# Patient Record
Sex: MALE | Race: WHITE | Employment: OTHER | URBAN - METROPOLITAN AREA
[De-identification: names, ages, dates, MRNs, and addresses within clinical notes are randomized per-mention and may not be internally consistent; named-entity substitution may affect disease eponyms.]

---

## 2017-03-22 ENCOUNTER — HOSPITAL ENCOUNTER (OUTPATIENT)
Age: 57
Discharge: HOME | End: 2017-03-22
Payer: COMMERCIAL

## 2017-03-22 ENCOUNTER — HOSPITAL ENCOUNTER (OUTPATIENT)
Dept: GENERAL RADIOLOGY | Age: 57
Discharge: HOME | End: 2017-03-22
Payer: COMMERCIAL

## 2017-03-22 ENCOUNTER — OFFICE VISIT (OUTPATIENT)
Dept: FAMILY MEDICINE CLINIC | Age: 57
End: 2017-03-22

## 2017-03-22 VITALS
OXYGEN SATURATION: 99 % | RESPIRATION RATE: 18 BRPM | SYSTOLIC BLOOD PRESSURE: 128 MMHG | WEIGHT: 220.5 LBS | HEIGHT: 73 IN | DIASTOLIC BLOOD PRESSURE: 68 MMHG | TEMPERATURE: 97.9 F | BODY MASS INDEX: 29.22 KG/M2 | HEART RATE: 60 BPM

## 2017-03-22 DIAGNOSIS — H93.8X3 EAR FULLNESS, BILATERAL: Primary | ICD-10-CM

## 2017-03-22 DIAGNOSIS — H61.23 EXCESSIVE CERUMEN IN EAR CANAL, BILATERAL: ICD-10-CM

## 2017-03-22 DIAGNOSIS — M25.562 ACUTE PAIN OF LEFT KNEE: ICD-10-CM

## 2017-03-22 PROCEDURE — 73562 X-RAY EXAM OF KNEE 3: CPT

## 2017-03-22 PROCEDURE — 99213 OFFICE O/P EST LOW 20 MIN: CPT | Performed by: NURSE PRACTITIONER

## 2017-03-22 PROCEDURE — 69210 REMOVE IMPACTED EAR WAX UNI: CPT | Performed by: NURSE PRACTITIONER

## 2017-03-28 ENCOUNTER — TELEPHONE (OUTPATIENT)
Dept: FAMILY MEDICINE CLINIC | Age: 57
End: 2017-03-28

## 2017-03-29 DIAGNOSIS — M23.8X2 CREPITUS OF JOINT OF LEFT KNEE: ICD-10-CM

## 2017-03-29 DIAGNOSIS — M25.562 ACUTE PAIN OF LEFT KNEE: Primary | ICD-10-CM

## 2017-04-08 ASSESSMENT — ENCOUNTER SYMPTOMS
NAUSEA: 0
ABDOMINAL DISTENTION: 0
SORE THROAT: 0
ANAL BLEEDING: 0
CONSTIPATION: 0
COUGH: 0
RHINORRHEA: 0
DIARRHEA: 0
WHEEZING: 0
VOMITING: 0
BLOOD IN STOOL: 0
SHORTNESS OF BREATH: 0
ABDOMINAL PAIN: 0
CHEST TIGHTNESS: 0

## 2018-02-22 ENCOUNTER — OFFICE VISIT (OUTPATIENT)
Dept: FAMILY MEDICINE CLINIC | Age: 58
End: 2018-02-22
Payer: COMMERCIAL

## 2018-02-22 VITALS
HEIGHT: 72 IN | DIASTOLIC BLOOD PRESSURE: 78 MMHG | HEART RATE: 59 BPM | SYSTOLIC BLOOD PRESSURE: 122 MMHG | BODY MASS INDEX: 31.29 KG/M2 | TEMPERATURE: 98.2 F | WEIGHT: 231 LBS | RESPIRATION RATE: 12 BRPM | OXYGEN SATURATION: 99 %

## 2018-02-22 DIAGNOSIS — R20.2 NUMBNESS AND TINGLING IN BOTH HANDS: ICD-10-CM

## 2018-02-22 DIAGNOSIS — H61.21 EXCESSIVE CERUMEN IN EAR CANAL, RIGHT: Primary | ICD-10-CM

## 2018-02-22 DIAGNOSIS — R20.0 NUMBNESS AND TINGLING IN BOTH HANDS: ICD-10-CM

## 2018-02-22 PROCEDURE — 99213 OFFICE O/P EST LOW 20 MIN: CPT | Performed by: NURSE PRACTITIONER

## 2018-02-22 PROCEDURE — 69210 REMOVE IMPACTED EAR WAX UNI: CPT | Performed by: NURSE PRACTITIONER

## 2018-03-01 ENCOUNTER — OFFICE VISIT (OUTPATIENT)
Dept: FAMILY MEDICINE CLINIC | Age: 58
End: 2018-03-01
Payer: COMMERCIAL

## 2018-03-01 VITALS
DIASTOLIC BLOOD PRESSURE: 76 MMHG | WEIGHT: 229.25 LBS | RESPIRATION RATE: 14 BRPM | HEART RATE: 67 BPM | OXYGEN SATURATION: 97 % | TEMPERATURE: 97.7 F | HEIGHT: 72 IN | BODY MASS INDEX: 31.05 KG/M2 | SYSTOLIC BLOOD PRESSURE: 132 MMHG

## 2018-03-01 DIAGNOSIS — H61.21 EXCESSIVE CERUMEN IN EAR CANAL, RIGHT: Primary | ICD-10-CM

## 2018-03-01 PROCEDURE — 99212 OFFICE O/P EST SF 10 MIN: CPT | Performed by: NURSE PRACTITIONER

## 2018-03-01 PROCEDURE — 69210 REMOVE IMPACTED EAR WAX UNI: CPT | Performed by: NURSE PRACTITIONER

## 2018-03-04 ASSESSMENT — ENCOUNTER SYMPTOMS
SHORTNESS OF BREATH: 0
COUGH: 0
RHINORRHEA: 0
VOMITING: 0
WHEEZING: 0
CONSTIPATION: 0
BLOOD IN STOOL: 0
ANAL BLEEDING: 0
DIARRHEA: 0
NAUSEA: 0
CHEST TIGHTNESS: 0
SORE THROAT: 0
ABDOMINAL PAIN: 0
ABDOMINAL DISTENTION: 0

## 2018-03-25 ASSESSMENT — ENCOUNTER SYMPTOMS
ABDOMINAL PAIN: 0
DIARRHEA: 0
NAUSEA: 0
VOMITING: 0
BLOOD IN STOOL: 0
ANAL BLEEDING: 0
SHORTNESS OF BREATH: 0
ABDOMINAL DISTENTION: 0
COUGH: 0
RHINORRHEA: 0
WHEEZING: 0
SORE THROAT: 0
CHEST TIGHTNESS: 0
CONSTIPATION: 0

## 2019-01-29 ENCOUNTER — OFFICE VISIT (OUTPATIENT)
Dept: INTERNAL MEDICINE | Age: 59
End: 2019-01-29
Payer: COMMERCIAL

## 2019-01-29 VITALS
RESPIRATION RATE: 12 BRPM | SYSTOLIC BLOOD PRESSURE: 124 MMHG | DIASTOLIC BLOOD PRESSURE: 74 MMHG | HEIGHT: 72 IN | BODY MASS INDEX: 30.8 KG/M2 | WEIGHT: 227.4 LBS | HEART RATE: 68 BPM

## 2019-01-29 DIAGNOSIS — L85.3 DRY SKIN DERMATITIS: Primary | ICD-10-CM

## 2019-01-29 PROCEDURE — 99202 OFFICE O/P NEW SF 15 MIN: CPT | Performed by: PHYSICIAN ASSISTANT

## 2019-01-29 RX ORDER — CHLORAL HYDRATE 500 MG
3000 CAPSULE ORAL DAILY
COMMUNITY

## 2019-01-29 RX ORDER — TRIAMCINOLONE ACETONIDE 5 MG/G
OINTMENT TOPICAL
Qty: 1 TUBE | Refills: 0 | Status: SHIPPED | OUTPATIENT
Start: 2019-01-29 | End: 2019-10-14 | Stop reason: SDUPTHER

## 2019-01-29 ASSESSMENT — PATIENT HEALTH QUESTIONNAIRE - PHQ9
SUM OF ALL RESPONSES TO PHQ QUESTIONS 1-9: 0
1. LITTLE INTEREST OR PLEASURE IN DOING THINGS: 0
SUM OF ALL RESPONSES TO PHQ9 QUESTIONS 1 & 2: 0
SUM OF ALL RESPONSES TO PHQ QUESTIONS 1-9: 0
2. FEELING DOWN, DEPRESSED OR HOPELESS: 0

## 2019-01-29 ASSESSMENT — ENCOUNTER SYMPTOMS: COLOR CHANGE: 1

## 2019-02-13 ENCOUNTER — OFFICE VISIT (OUTPATIENT)
Dept: INTERNAL MEDICINE | Age: 59
End: 2019-02-13
Payer: COMMERCIAL

## 2019-02-13 VITALS
SYSTOLIC BLOOD PRESSURE: 116 MMHG | DIASTOLIC BLOOD PRESSURE: 70 MMHG | WEIGHT: 227.4 LBS | TEMPERATURE: 98.3 F | OXYGEN SATURATION: 98 % | HEART RATE: 65 BPM | HEIGHT: 72 IN | BODY MASS INDEX: 30.8 KG/M2

## 2019-02-13 DIAGNOSIS — G56.03 BILATERAL CARPAL TUNNEL SYNDROME: Primary | ICD-10-CM

## 2019-02-13 PROCEDURE — 99214 OFFICE O/P EST MOD 30 MIN: CPT | Performed by: FAMILY MEDICINE

## 2019-02-13 RX ORDER — GLUCOSAMINE SULFATE 500 MG
1000 CAPSULE ORAL
COMMUNITY

## 2019-02-13 ASSESSMENT — ENCOUNTER SYMPTOMS
EYE ITCHING: 0
APNEA: 0
CHEST TIGHTNESS: 0
CHOKING: 0
EYE DISCHARGE: 0
EYE PAIN: 0

## 2019-04-03 ENCOUNTER — HOSPITAL ENCOUNTER (OUTPATIENT)
Dept: NEUROLOGY | Age: 59
Discharge: HOME | End: 2019-04-03
Payer: COMMERCIAL

## 2019-04-03 DIAGNOSIS — G56.03 BILATERAL CARPAL TUNNEL SYNDROME: ICD-10-CM

## 2019-04-03 PROCEDURE — 95886 MUSC TEST DONE W/N TEST COMP: CPT

## 2019-04-03 PROCEDURE — 95910 NRV CNDJ TEST 7-8 STUDIES: CPT

## 2019-04-03 NOTE — PROCEDURES
[address]                      [facility], [address] [facility]                             ELECTROMYOGRAM REPORT    PATIENT NAME: Mart Franco                 :        1960  MED REC NO:   [id number]                            ROOM:  ACCOUNT NO:   [de-identified]                           ADMIT DATE: 2019  PROVIDER:     Glendell Hammans, MD    DATE OF EM2019    REFERRING PROVIDER:  Lizz Martinez MD.    REASON FOR STUDY:  The patient was having numbness in the hands. EMG  study was done to look for peripheral nerve entrapments versus  peripheral neuropathy versus cervical radiculopathy. Motor nerve conduction velocities slowed in the left median nerve,  borderline in the right median nerve and normal in the ulnar nerves  bilaterally. Distal motor and sensory latencies are normal in the ulnar nerves but significantly delayed in the median nerves, being worse on the left side. On the concentric needle electrode examination, denervation changes are  present in the thenar muscles. Denervation changes are also present in the C5-C6 root distribution  bilaterally. 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 The patient also has moderate changes of C5 radiculopathy with  mild-to-moderate involvement of C6 roots. Cervical radiculopathy seems asymptomatic at this time. The patient is being tried on conservative management. Due to continued  symptoms, he will need decompression of the median nerves. If clinically indicated, imaging of the cervical canal may be done to  look for compromise of the spinal canal and/or foramina. Thank you very much Dr. Woody Augustine for allowing me to see the patient.    Please feel free to call me if I can be of any further

## 2019-04-23 ENCOUNTER — HOSPITAL ENCOUNTER (OUTPATIENT)
Age: 59
Discharge: HOME | End: 2019-04-25
Payer: COMMERCIAL

## 2019-04-23 ENCOUNTER — HOSPITAL ENCOUNTER (OUTPATIENT)
Dept: GENERAL RADIOLOGY | Age: 59
Discharge: HOME | End: 2019-04-25
Payer: COMMERCIAL

## 2019-04-23 ENCOUNTER — HOSPITAL ENCOUNTER (OUTPATIENT)
Age: 59
Discharge: HOME | End: 2019-04-23
Payer: COMMERCIAL

## 2019-04-23 ENCOUNTER — HOSPITAL ENCOUNTER (OUTPATIENT)
Dept: LAB | Age: 59
Discharge: HOME | End: 2019-04-23
Payer: COMMERCIAL

## 2019-04-23 DIAGNOSIS — Z01.818 PREOPERATIVE EVALUATION TO RULE OUT SURGICAL CONTRAINDICATION: ICD-10-CM

## 2019-04-23 LAB
ANION GAP SERPL CALCULATED.3IONS-SCNC: 13 MEQ/L (ref 9–15)
BASOPHILS ABSOLUTE: 0.1 K/UL (ref 0–0.2)
BASOPHILS RELATIVE PERCENT: 0.8 %
BUN BLDV-MCNC: 16 MG/DL (ref 6–20)
CALCIUM SERPL-MCNC: 9.2 MG/DL (ref 8.5–9.9)
CHLORIDE BLD-SCNC: 106 MEQ/L (ref 95–107)
CO2: 22 MEQ/L (ref 20–31)
CREAT SERPL-MCNC: 0.61 MG/DL (ref 0.7–1.2)
EKG ATRIAL RATE: 61 BPM
EKG P AXIS: 61 DEGREES
EKG P-R INTERVAL: 164 MS
EKG Q-T INTERVAL: 400 MS
EKG QRS DURATION: 86 MS
EKG QTC CALCULATION (BAZETT): 402 MS
EKG R AXIS: -8 DEGREES
EKG T AXIS: 49 DEGREES
EKG VENTRICULAR RATE: 61 BPM
EOSINOPHILS ABSOLUTE: 0.3 K/UL (ref 0–0.7)
EOSINOPHILS RELATIVE PERCENT: 4.7 %
GFR AFRICAN AMERICAN: >60
GFR NON-AFRICAN AMERICAN: >60
GLUCOSE BLD-MCNC: 122 MG/DL (ref 70–99)
HCT VFR BLD CALC: 43.9 % (ref 42–52)
HEMOGLOBIN: 15 G/DL (ref 14–18)
LYMPHOCYTES ABSOLUTE: 2.4 K/UL (ref 1–4.8)
LYMPHOCYTES RELATIVE PERCENT: 34.1 %
MCH RBC QN AUTO: 30.5 PG (ref 27–31.3)
MCHC RBC AUTO-ENTMCNC: 34.1 % (ref 33–37)
MCV RBC AUTO: 89.4 FL (ref 80–100)
MONOCYTES ABSOLUTE: 0.6 K/UL (ref 0.2–0.8)
MONOCYTES RELATIVE PERCENT: 8.3 %
NEUTROPHILS ABSOLUTE: 3.6 K/UL (ref 1.4–6.5)
NEUTROPHILS RELATIVE PERCENT: 52.1 %
PDW BLD-RTO: 13.2 % (ref 11.5–14.5)
PLATELET # BLD: 252 K/UL (ref 130–400)
POTASSIUM SERPL-SCNC: 4.4 MEQ/L (ref 3.4–4.9)
RBC # BLD: 4.91 M/UL (ref 4.7–6.1)
SODIUM BLD-SCNC: 141 MEQ/L (ref 135–144)
WBC # BLD: 7 K/UL (ref 4.8–10.8)

## 2019-04-23 PROCEDURE — 93005 ELECTROCARDIOGRAM TRACING: CPT

## 2019-04-23 PROCEDURE — 85025 COMPLETE CBC W/AUTO DIFF WBC: CPT

## 2019-04-23 PROCEDURE — 71046 X-RAY EXAM CHEST 2 VIEWS: CPT

## 2019-04-23 PROCEDURE — 36415 COLL VENOUS BLD VENIPUNCTURE: CPT

## 2019-04-23 PROCEDURE — 80048 BASIC METABOLIC PNL TOTAL CA: CPT

## 2019-04-24 PROCEDURE — 93010 ELECTROCARDIOGRAM REPORT: CPT | Performed by: INTERNAL MEDICINE

## 2019-05-01 ENCOUNTER — ANESTHESIA EVENT (OUTPATIENT)
Dept: OPERATING ROOM | Age: 59
End: 2019-05-01
Payer: COMMERCIAL

## 2019-05-02 ENCOUNTER — ANESTHESIA (OUTPATIENT)
Dept: OPERATING ROOM | Age: 59
End: 2019-05-02
Payer: COMMERCIAL

## 2019-05-02 ENCOUNTER — HOSPITAL ENCOUNTER (OUTPATIENT)
Age: 59
Setting detail: OUTPATIENT SURGERY
Discharge: HOME | End: 2019-05-02
Attending: ORTHOPAEDIC SURGERY | Admitting: ORTHOPAEDIC SURGERY
Payer: COMMERCIAL

## 2019-05-02 VITALS
OXYGEN SATURATION: 94 % | TEMPERATURE: 97.1 F | WEIGHT: 220 LBS | DIASTOLIC BLOOD PRESSURE: 66 MMHG | HEIGHT: 73 IN | BODY MASS INDEX: 29.16 KG/M2 | HEART RATE: 58 BPM | RESPIRATION RATE: 16 BRPM | SYSTOLIC BLOOD PRESSURE: 110 MMHG

## 2019-05-02 VITALS
RESPIRATION RATE: 1 BRPM | OXYGEN SATURATION: 97 % | DIASTOLIC BLOOD PRESSURE: 68 MMHG | SYSTOLIC BLOOD PRESSURE: 118 MMHG

## 2019-05-02 PROCEDURE — 2720000010 HC SURG SUPPLY STERILE: Performed by: ORTHOPAEDIC SURGERY

## 2019-05-02 PROCEDURE — 2709999900 HC NON-CHARGEABLE SUPPLY: Performed by: ORTHOPAEDIC SURGERY

## 2019-05-02 PROCEDURE — 3600000003 HC SURGERY LEVEL 3 BASE: Performed by: ORTHOPAEDIC SURGERY

## 2019-05-02 PROCEDURE — 3700000001 HC ADD 15 MINUTES (ANESTHESIA): Performed by: ORTHOPAEDIC SURGERY

## 2019-05-02 PROCEDURE — 2580000003 HC RX 258: Performed by: ORTHOPAEDIC SURGERY

## 2019-05-02 PROCEDURE — 3600000013 HC SURGERY LEVEL 3 ADDTL 15MIN: Performed by: ORTHOPAEDIC SURGERY

## 2019-05-02 PROCEDURE — 2500000003 HC RX 250 WO HCPCS: Performed by: NURSE ANESTHETIST, CERTIFIED REGISTERED

## 2019-05-02 PROCEDURE — 7100000011 HC PHASE II RECOVERY - ADDTL 15 MIN: Performed by: ORTHOPAEDIC SURGERY

## 2019-05-02 PROCEDURE — 6360000002 HC RX W HCPCS: Performed by: ORTHOPAEDIC SURGERY

## 2019-05-02 PROCEDURE — 3700000000 HC ANESTHESIA ATTENDED CARE: Performed by: ORTHOPAEDIC SURGERY

## 2019-05-02 PROCEDURE — 7100000010 HC PHASE II RECOVERY - FIRST 15 MIN: Performed by: ORTHOPAEDIC SURGERY

## 2019-05-02 PROCEDURE — 6360000002 HC RX W HCPCS: Performed by: NURSE ANESTHETIST, CERTIFIED REGISTERED

## 2019-05-02 RX ORDER — ONDANSETRON 2 MG/ML
4 INJECTION INTRAMUSCULAR; INTRAVENOUS
Status: DISCONTINUED | OUTPATIENT
Start: 2019-05-02 | End: 2019-05-02 | Stop reason: HOSPADM

## 2019-05-02 RX ORDER — BUPIVACAINE HYDROCHLORIDE 5 MG/ML
INJECTION, SOLUTION PERINEURAL PRN
Status: DISCONTINUED | OUTPATIENT
Start: 2019-05-02 | End: 2019-05-02 | Stop reason: ALTCHOICE

## 2019-05-02 RX ORDER — LIDOCAINE HYDROCHLORIDE 5 MG/ML
INJECTION, SOLUTION INFILTRATION; PERINEURAL PRN
Status: DISCONTINUED | OUTPATIENT
Start: 2019-05-02 | End: 2019-05-02 | Stop reason: SDUPTHER

## 2019-05-02 RX ORDER — MIDAZOLAM HYDROCHLORIDE 1 MG/ML
INJECTION INTRAMUSCULAR; INTRAVENOUS PRN
Status: DISCONTINUED | OUTPATIENT
Start: 2019-05-02 | End: 2019-05-02 | Stop reason: SDUPTHER

## 2019-05-02 RX ORDER — DOCUSATE SODIUM 100 MG/1
100 CAPSULE, LIQUID FILLED ORAL 2 TIMES DAILY
Status: DISCONTINUED | OUTPATIENT
Start: 2019-05-02 | End: 2019-05-02 | Stop reason: HOSPADM

## 2019-05-02 RX ORDER — SODIUM CHLORIDE, SODIUM LACTATE, POTASSIUM CHLORIDE, CALCIUM CHLORIDE 600; 310; 30; 20 MG/100ML; MG/100ML; MG/100ML; MG/100ML
INJECTION, SOLUTION INTRAVENOUS CONTINUOUS
Status: DISCONTINUED | OUTPATIENT
Start: 2019-05-02 | End: 2019-05-02 | Stop reason: HOSPADM

## 2019-05-02 RX ORDER — PROPOFOL 10 MG/ML
INJECTION, EMULSION INTRAVENOUS PRN
Status: DISCONTINUED | OUTPATIENT
Start: 2019-05-02 | End: 2019-05-02 | Stop reason: SDUPTHER

## 2019-05-02 RX ORDER — LIDOCAINE HYDROCHLORIDE 20 MG/ML
INJECTION, SOLUTION EPIDURAL; INFILTRATION; INTRACAUDAL; PERINEURAL PRN
Status: DISCONTINUED | OUTPATIENT
Start: 2019-05-02 | End: 2019-05-02 | Stop reason: SDUPTHER

## 2019-05-02 RX ORDER — SODIUM CHLORIDE 0.9 % (FLUSH) 0.9 %
10 SYRINGE (ML) INJECTION PRN
Status: DISCONTINUED | OUTPATIENT
Start: 2019-05-02 | End: 2019-05-02 | Stop reason: HOSPADM

## 2019-05-02 RX ORDER — FENTANYL CITRATE 50 UG/ML
50 INJECTION, SOLUTION INTRAMUSCULAR; INTRAVENOUS EVERY 5 MIN PRN
Status: DISCONTINUED | OUTPATIENT
Start: 2019-05-02 | End: 2019-05-02 | Stop reason: HOSPADM

## 2019-05-02 RX ORDER — HYDROCODONE BITARTRATE AND ACETAMINOPHEN 5; 325 MG/1; MG/1
1 TABLET ORAL
Status: DISCONTINUED | OUTPATIENT
Start: 2019-05-02 | End: 2019-05-02 | Stop reason: HOSPADM

## 2019-05-02 RX ORDER — KETOROLAC TROMETHAMINE 30 MG/ML
INJECTION, SOLUTION INTRAMUSCULAR; INTRAVENOUS PRN
Status: DISCONTINUED | OUTPATIENT
Start: 2019-05-02 | End: 2019-05-02 | Stop reason: SDUPTHER

## 2019-05-02 RX ORDER — SODIUM CHLORIDE 0.9 % (FLUSH) 0.9 %
10 SYRINGE (ML) INJECTION EVERY 12 HOURS SCHEDULED
Status: DISCONTINUED | OUTPATIENT
Start: 2019-05-02 | End: 2019-05-02 | Stop reason: HOSPADM

## 2019-05-02 RX ADMIN — DEXTROSE MONOHYDRATE 2 G: 50 INJECTION, SOLUTION INTRAVENOUS at 12:16

## 2019-05-02 RX ADMIN — LIDOCAINE HYDROCHLORIDE 25 ML: 5 INJECTION, SOLUTION INFILTRATION; PERINEURAL at 12:27

## 2019-05-02 RX ADMIN — MIDAZOLAM HYDROCHLORIDE 2 MG: 1 INJECTION, SOLUTION INTRAMUSCULAR; INTRAVENOUS at 12:22

## 2019-05-02 RX ADMIN — PROPOFOL 30 MG: 10 INJECTION, EMULSION INTRAVENOUS at 12:37

## 2019-05-02 RX ADMIN — KETOROLAC TROMETHAMINE 30 MG: 30 INJECTION, SOLUTION INTRAMUSCULAR; INTRAVENOUS at 12:27

## 2019-05-02 RX ADMIN — LIDOCAINE HYDROCHLORIDE 20 MG: 20 INJECTION, SOLUTION EPIDURAL; INFILTRATION; INTRACAUDAL at 12:28

## 2019-05-02 RX ADMIN — PROPOFOL 30 MG: 10 INJECTION, EMULSION INTRAVENOUS at 12:28

## 2019-05-02 RX ADMIN — LIDOCAINE HYDROCHLORIDE 40 MG: 20 INJECTION, SOLUTION EPIDURAL; INFILTRATION; INTRACAUDAL at 12:22

## 2019-05-02 RX ADMIN — PROPOFOL 40 MG: 10 INJECTION, EMULSION INTRAVENOUS at 12:39

## 2019-05-02 RX ADMIN — PROPOFOL 20 MG: 10 INJECTION, EMULSION INTRAVENOUS at 12:34

## 2019-05-02 RX ADMIN — PROPOFOL 20 MG: 10 INJECTION, EMULSION INTRAVENOUS at 12:31

## 2019-05-02 RX ADMIN — PROPOFOL 20 MG: 10 INJECTION, EMULSION INTRAVENOUS at 12:22

## 2019-05-02 RX ADMIN — SODIUM CHLORIDE, POTASSIUM CHLORIDE, SODIUM LACTATE AND CALCIUM CHLORIDE: 600; 310; 30; 20 INJECTION, SOLUTION INTRAVENOUS at 10:52

## 2019-05-02 ASSESSMENT — PULMONARY FUNCTION TESTS
PIF_VALUE: 1
PIF_VALUE: 1
PIF_VALUE: 0
PIF_VALUE: 1
PIF_VALUE: 0
PIF_VALUE: 1
PIF_VALUE: 0
PIF_VALUE: 1

## 2019-05-02 ASSESSMENT — PAIN - FUNCTIONAL ASSESSMENT: PAIN_FUNCTIONAL_ASSESSMENT: 0-10

## 2019-05-02 ASSESSMENT — PAIN SCALES - GENERAL
PAINLEVEL_OUTOF10: 0

## 2019-05-02 NOTE — PROGRESS NOTES
Discharge Criteria    Inpatients must meet Criteria 1 through 7. All other patients are either YES or N/A. If a NO is chosen then Anesthesia or Surgeon must be notified. 1.  Minimum 30 minutes after last dose of sedative medication, minimum 120 minutes after last dose of reversal agent. Yes      2. Systolic BP stable within 20 mmHg for 30 minutes & systolic BP between 90 & 609 or within 10 mmHg of baseline. Yes      3. Pulse between 60 and 100 or within 10 bpm of baseline. Yes      4. Spontaneous respiratory rate >/= 10 per minute. Yes      5. SaO2 >/= 95 or  >/= baseline. Yes      6. Able to cough and swallow or return to baseline function. Yes      7. Alert and oriented or return to baseline mental status. Yes      8. Demonstrates controlled, coordinated movements, ambulates with steady gait, or return to baseline activity function. Yes      9. Minimal or no pain or nausea, or at a level tolerable and acceptable to patient. Yes      10. Takes and retains oral fluids as allowed. Yes      11. Procedural / perioperative site stable. Minimal or no bleeding. Yes          12. If GI endoscopy procedure, minimal or no abdominal distention or passing flatus. N/A      13. Written discharge instructions and emergency telephone number provided. Yes      14. Accompanied by a responsible adult. Yes      Adult patient discharged from facility without responsible person meets above criteria plus the following:   a) remains awake without stimulus for 30 minutes     b) oriented appropriate for age      c) all vital signs stable   d) no significant risk of losing protective reflexes      e) able to maintain pre-procedure mobility without assistance   f) no nausea or dizziness      g) transportation arrangements that do not require patient to operate motor Vehicle.      N/A

## 2019-05-02 NOTE — ANESTHESIA PRE PROCEDURE
Department of Anesthesiology  Preprocedure Note       Name:  David Traylor   Age:  63 y.o.  :  1960                                          MRN:  [id number]         Date:  2019      Surgeon: Tristian Wood):  Mandi Winchester MD    Procedure: CARPAL TUNNEL RELEASE (Left )    Medications prior to admission:   Prior to Admission medications    Medication Sig Start Date End Date Taking? Authorizing Provider   triamcinolone (ARISTOCORT) 0.5 % ointment Apply topically 2 times daily. 19  Yes MATT Becker   Glucosamine 500 MG CAPS Take 1,000 mg by mouth    Historical Provider, MD   Multiple Vitamin (MULTIVITAMINS PO) Take by mouth daily    Historical Provider, MD   Omega-3 Fatty Acids (FISH OIL) 1000 MG CAPS Take 3,000 mg by mouth daily    Historical Provider, MD       Current medications:    Current Facility-Administered Medications   Medication Dose Route Frequency Provider Last Rate Last Dose    ceFAZolin (ANCEF) 2 g in dextrose 5 % 100 mL IVPB  2 g Intravenous Once Mandi Winchester MD        lactated ringers infusion   Intravenous Continuous Mandi Winchester  mL/hr at 19 1052         Allergies:  No Known Allergies    Problem List:  There is no problem list on file for this patient. Past Medical History:  History reviewed. No pertinent past medical history. Past Surgical History:        Procedure Laterality Date    FINGER AMPUTATION Left 9years old. Ring finger.     INCISION AND DRAINAGE OF NECK ABSCESS         Social History:    Social History     Tobacco Use    Smoking status: Former Smoker     Packs/day: 1.00     Years: 20.00     Pack years: 20.00     Types: Cigarettes     Last attempt to quit: 2002     Years since quittin.2    Smokeless tobacco: Never Used   Substance Use Topics    Alcohol use: Yes     Comment: occasionally                                Counseling given: Not Answered      Vital Signs (Current):   Vitals: exam  breath sounds clear to auscultation                             Cardiovascular:Negative CV ROS            Rhythm: regular  Rate: normal                    Neuro/Psych:   Negative Neuro/Psych ROS              GI/Hepatic/Renal: Neg GI/Hepatic/Renal ROS            Endo/Other: Negative Endo/Other ROS                    Abdominal:           Vascular: negative vascular ROS. Anesthesia Plan      [place] block     ASA 1       Induction: intravenous. Anesthetic plan and risks discussed with patient and spouse.                       MODESTO Little - CRNA   5/2/2019

## 2019-05-02 NOTE — ANESTHESIA PROCEDURE NOTES
Peripheral Block    Patient location during procedure: OR  Start time: 5/2/2019 12:23 PM  End time: 5/2/2019 12:27 PM  Staffing  Resident/CRNA: MODESTO Ward CRNA  Preanesthetic Checklist  Completed: patient identified, site marked, surgical consent, pre-op evaluation, timeout performed, IV checked, risks and benefits discussed, monitors and equipment checked, anesthesia consent given, oxygen available and patient being monitored  Peripheral Block  Patient position: supine  Prep: alcohol swabs  Patient monitoring: cardiac monitor, continuous pulse ox, continuous capnometry, frequent blood pressure checks and IV access  Block type: Larissa block  Laterality: left  Injection technique: single-shot  Procedures: paresthesia technique  Local infiltration: lidocaine  Infiltration strength: 0.5 %  Dose: 25 mL  Provider prep: mask  Local infiltration: lidocaine  Needle  Needle type: Other   Needle gauge: 22 G  Needle localization: paresthesiasOther needle type: 22 angio  Assessment  Injection assessment: no paresthesia on injection  Paresthesia pain: none  Slow fractionated injection: yes  Hemodynamics: stable  Additional Notes  Iv block right. 18 single tq forearm. Esmark wrap to tq. tq inflated to 250mmhg. Lido 0.5% 25ml + torodol 30mg injected.   Patient tolerated well  Reason for block: primary anesthetic

## 2019-05-03 NOTE — OP NOTE
[address] [place], [place] [address]                                OPERATIVE REPORT    PATIENT NAME: Casi Perez                 :        1960  MED REC NO:   [id number]                              ROOM:  ACCOUNT NO:   [de-identified]                           ADMIT DATE: 2019  PROVIDER:     Zoraida Ralph. ERASTO De La Cruz Daubs:  2019    PREOPERATIVE DIAGNOSIS:  Left hand carpal tunnel syndrome. PROCEDURE PERFORMED:  Left hand carpal tunnel release, mini open using  [facility]. POSTOPERATIVE DIAGNOSIS:  Left hand carpal tunnel syndrome. SURGEON:  Zoraida Ralph. ERASTO De La Cruzk:  None. COMPLICATIONS:  None. DISPOSITION:  Recovery room in stable condition. DESCRIPTION OF PROCEDURE:  The patient was taken to the operating room,  placed under Larissa block anesthesia, given 2 gm of Ancef. His left hand  was prepped and draped in the usual fashion. A 1.5 cm incision was made  starting at Mejia's cardinal line in line with the fourth finger down  through the subcutaneous tissues and palmar fascia. Distal border of  the transverse carpal ligament was identified. _____ 1, 2, and 3 of an  [facility] system was placed deep to the structure and then the  transverse carpal ligament was transected with a cutting device. The  nerve was then inspected under loupe magnification. No damage to the  nerve was seen from the surgery. The wound was then irrigated and  closed with running 5-0 Prolene stitch, 3-0 Prolene stitch, and then  dressed with sterile dressings after being injected with 0.5% Marcaine  plain. This was a clean case.         Lucas Wick M.D.    D: 2019 12:59:47       T: 2019 20:52:48     [email or web address]  Job#: [id number]     Doc#: [id number]    CC:  Yvon Tuttle

## 2019-10-14 DIAGNOSIS — L85.3 DRY SKIN DERMATITIS: ICD-10-CM

## 2019-10-14 RX ORDER — TRIAMCINOLONE ACETONIDE 5 MG/G
OINTMENT TOPICAL
Qty: 1 TUBE | Refills: 0 | Status: SHIPPED | OUTPATIENT
Start: 2019-10-14 | End: 2021-04-30 | Stop reason: ALTCHOICE

## 2019-11-11 ENCOUNTER — HOSPITAL ENCOUNTER (OUTPATIENT)
Dept: LAB | Age: 59
Discharge: HOME | End: 2019-11-11
Payer: COMMERCIAL

## 2019-11-11 LAB
ANION GAP SERPL CALCULATED.3IONS-SCNC: 11 MEQ/L (ref 9–15)
BASOPHILS ABSOLUTE: 0 K/UL (ref 0–0.2)
BASOPHILS RELATIVE PERCENT: 0.6 %
BUN BLDV-MCNC: 12 MG/DL (ref 6–20)
CALCIUM SERPL-MCNC: 9.5 MG/DL (ref 8.5–9.9)
CHLORIDE BLD-SCNC: 103 MEQ/L (ref 95–107)
CO2: 27 MEQ/L (ref 20–31)
CREAT SERPL-MCNC: 0.89 MG/DL (ref 0.7–1.2)
EOSINOPHILS ABSOLUTE: 0.2 K/UL (ref 0–0.7)
EOSINOPHILS RELATIVE PERCENT: 2.8 %
GFR AFRICAN AMERICAN: >60
GFR NON-AFRICAN AMERICAN: >60
GLUCOSE BLD-MCNC: 114 MG/DL (ref 70–99)
HCT VFR BLD CALC: 44.7 % (ref 42–52)
HEMOGLOBIN: 14.7 G/DL (ref 14–18)
LYMPHOCYTES ABSOLUTE: 1.9 K/UL (ref 1–4.8)
LYMPHOCYTES RELATIVE PERCENT: 26.3 %
MCH RBC QN AUTO: 29.8 PG (ref 27–31.3)
MCHC RBC AUTO-ENTMCNC: 33 % (ref 33–37)
MCV RBC AUTO: 90.5 FL (ref 80–100)
MONOCYTES ABSOLUTE: 0.8 K/UL (ref 0.2–0.8)
MONOCYTES RELATIVE PERCENT: 11.2 %
NEUTROPHILS ABSOLUTE: 4.3 K/UL (ref 1.4–6.5)
NEUTROPHILS RELATIVE PERCENT: 59.1 %
PDW BLD-RTO: 13.3 % (ref 11.5–14.5)
PLATELET # BLD: 286 K/UL (ref 130–400)
POTASSIUM SERPL-SCNC: 4.3 MEQ/L (ref 3.4–4.9)
RBC # BLD: 4.93 M/UL (ref 4.7–6.1)
SODIUM BLD-SCNC: 141 MEQ/L (ref 135–144)
WBC # BLD: 7.3 K/UL (ref 4.8–10.8)

## 2019-11-11 PROCEDURE — 80048 BASIC METABOLIC PNL TOTAL CA: CPT

## 2019-11-11 PROCEDURE — 85025 COMPLETE CBC W/AUTO DIFF WBC: CPT

## 2019-11-11 PROCEDURE — 36415 COLL VENOUS BLD VENIPUNCTURE: CPT

## 2019-11-19 ENCOUNTER — ANESTHESIA EVENT (OUTPATIENT)
Dept: OPERATING ROOM | Age: 59
End: 2019-11-19
Payer: COMMERCIAL

## 2019-11-21 ENCOUNTER — HOSPITAL ENCOUNTER (OUTPATIENT)
Age: 59
Discharge: HOME | End: 2019-11-21
Payer: COMMERCIAL

## 2019-11-21 LAB
EKG ATRIAL RATE: 60 BPM
EKG P AXIS: 64 DEGREES
EKG P-R INTERVAL: 170 MS
EKG Q-T INTERVAL: 400 MS
EKG QRS DURATION: 78 MS
EKG QTC CALCULATION (BAZETT): 400 MS
EKG R AXIS: -2 DEGREES
EKG T AXIS: 42 DEGREES
EKG VENTRICULAR RATE: 60 BPM

## 2019-11-21 PROCEDURE — 93005 ELECTROCARDIOGRAM TRACING: CPT

## 2019-11-22 PROCEDURE — 93010 ELECTROCARDIOGRAM REPORT: CPT | Performed by: INTERNAL MEDICINE

## 2019-11-27 ENCOUNTER — ANESTHESIA (OUTPATIENT)
Dept: OPERATING ROOM | Age: 59
End: 2019-11-27
Payer: COMMERCIAL

## 2019-11-27 ENCOUNTER — HOSPITAL ENCOUNTER (OUTPATIENT)
Age: 59
Setting detail: OUTPATIENT SURGERY
Discharge: HOME | End: 2019-11-27
Attending: ORTHOPAEDIC SURGERY | Admitting: ORTHOPAEDIC SURGERY
Payer: COMMERCIAL

## 2019-11-27 VITALS
TEMPERATURE: 98.6 F | WEIGHT: 221 LBS | HEIGHT: 73 IN | DIASTOLIC BLOOD PRESSURE: 74 MMHG | SYSTOLIC BLOOD PRESSURE: 128 MMHG | HEART RATE: 72 BPM | RESPIRATION RATE: 18 BRPM | BODY MASS INDEX: 29.29 KG/M2 | OXYGEN SATURATION: 96 %

## 2019-11-27 VITALS — DIASTOLIC BLOOD PRESSURE: 66 MMHG | SYSTOLIC BLOOD PRESSURE: 122 MMHG | OXYGEN SATURATION: 95 %

## 2019-11-27 PROCEDURE — 7100000011 HC PHASE II RECOVERY - ADDTL 15 MIN: Performed by: ORTHOPAEDIC SURGERY

## 2019-11-27 PROCEDURE — 2500000003 HC RX 250 WO HCPCS: Performed by: ORTHOPAEDIC SURGERY

## 2019-11-27 PROCEDURE — 7100000010 HC PHASE II RECOVERY - FIRST 15 MIN: Performed by: ORTHOPAEDIC SURGERY

## 2019-11-27 PROCEDURE — 3600000013 HC SURGERY LEVEL 3 ADDTL 15MIN: Performed by: ORTHOPAEDIC SURGERY

## 2019-11-27 PROCEDURE — 6360000002 HC RX W HCPCS: Performed by: ORTHOPAEDIC SURGERY

## 2019-11-27 PROCEDURE — 6370000000 HC RX 637 (ALT 250 FOR IP): Performed by: ORTHOPAEDIC SURGERY

## 2019-11-27 PROCEDURE — 2580000003 HC RX 258: Performed by: ORTHOPAEDIC SURGERY

## 2019-11-27 PROCEDURE — 3700000000 HC ANESTHESIA ATTENDED CARE: Performed by: ORTHOPAEDIC SURGERY

## 2019-11-27 PROCEDURE — 2709999900 HC NON-CHARGEABLE SUPPLY: Performed by: ORTHOPAEDIC SURGERY

## 2019-11-27 PROCEDURE — 6360000002 HC RX W HCPCS: Performed by: NURSE ANESTHETIST, CERTIFIED REGISTERED

## 2019-11-27 PROCEDURE — 3600000003 HC SURGERY LEVEL 3 BASE: Performed by: ORTHOPAEDIC SURGERY

## 2019-11-27 PROCEDURE — 3700000001 HC ADD 15 MINUTES (ANESTHESIA): Performed by: ORTHOPAEDIC SURGERY

## 2019-11-27 RX ORDER — MIDAZOLAM HYDROCHLORIDE 1 MG/ML
INJECTION INTRAMUSCULAR; INTRAVENOUS PRN
Status: DISCONTINUED | OUTPATIENT
Start: 2019-11-27 | End: 2019-11-27 | Stop reason: SDUPTHER

## 2019-11-27 RX ORDER — ONDANSETRON 2 MG/ML
4 INJECTION INTRAMUSCULAR; INTRAVENOUS
Status: DISCONTINUED | OUTPATIENT
Start: 2019-11-27 | End: 2019-11-27 | Stop reason: HOSPADM

## 2019-11-27 RX ORDER — SODIUM CHLORIDE, SODIUM LACTATE, POTASSIUM CHLORIDE, CALCIUM CHLORIDE 600; 310; 30; 20 MG/100ML; MG/100ML; MG/100ML; MG/100ML
INJECTION, SOLUTION INTRAVENOUS CONTINUOUS
Status: DISCONTINUED | OUTPATIENT
Start: 2019-11-27 | End: 2019-11-27 | Stop reason: HOSPADM

## 2019-11-27 RX ORDER — PROPOFOL 10 MG/ML
INJECTION, EMULSION INTRAVENOUS PRN
Status: DISCONTINUED | OUTPATIENT
Start: 2019-11-27 | End: 2019-11-27 | Stop reason: SDUPTHER

## 2019-11-27 RX ORDER — BUPIVACAINE HYDROCHLORIDE 5 MG/ML
INJECTION, SOLUTION EPIDURAL; INTRACAUDAL PRN
Status: DISCONTINUED | OUTPATIENT
Start: 2019-11-27 | End: 2019-11-27 | Stop reason: ALTCHOICE

## 2019-11-27 RX ORDER — DIMENHYDRINATE 50 MG/1
50 TABLET ORAL
Status: COMPLETED | OUTPATIENT
Start: 2019-11-27 | End: 2019-11-27

## 2019-11-27 RX ORDER — PROPOFOL 10 MG/ML
INJECTION, EMULSION INTRAVENOUS CONTINUOUS PRN
Status: DISCONTINUED | OUTPATIENT
Start: 2019-11-27 | End: 2019-11-27 | Stop reason: SDUPTHER

## 2019-11-27 RX ORDER — LIDOCAINE HYDROCHLORIDE 5 MG/ML
INJECTION, SOLUTION INFILTRATION; PERINEURAL PRN
Status: DISCONTINUED | OUTPATIENT
Start: 2019-11-27 | End: 2019-11-27 | Stop reason: SDUPTHER

## 2019-11-27 RX ORDER — ACETAMINOPHEN 325 MG/1
650 TABLET ORAL ONCE
Status: COMPLETED | OUTPATIENT
Start: 2019-11-27 | End: 2019-11-27

## 2019-11-27 RX ADMIN — ACETAMINOPHEN 650 MG: 325 TABLET, FILM COATED ORAL at 06:55

## 2019-11-27 RX ADMIN — DEXTROSE MONOHYDRATE 2 G: 50 INJECTION, SOLUTION INTRAVENOUS at 07:59

## 2019-11-27 RX ADMIN — PROPOFOL 50 MG: 10 INJECTION, EMULSION INTRAVENOUS at 08:11

## 2019-11-27 RX ADMIN — PROPOFOL 75 MCG/KG/MIN: 10 INJECTION, EMULSION INTRAVENOUS at 08:11

## 2019-11-27 RX ADMIN — MIDAZOLAM 2 MG: 1 INJECTION INTRAMUSCULAR; INTRAVENOUS at 08:04

## 2019-11-27 RX ADMIN — LIDOCAINE HYDROCHLORIDE 30 ML: 5 INJECTION, SOLUTION INFILTRATION; PERINEURAL at 08:08

## 2019-11-27 RX ADMIN — DIMENHYDRINATE 50 MG: 50 TABLET ORAL at 06:55

## 2019-11-27 RX ADMIN — SODIUM CHLORIDE, POTASSIUM CHLORIDE, SODIUM LACTATE AND CALCIUM CHLORIDE: 600; 310; 30; 20 INJECTION, SOLUTION INTRAVENOUS at 07:22

## 2019-11-27 RX ADMIN — PROPOFOL 40 MG: 10 INJECTION, EMULSION INTRAVENOUS at 08:17

## 2019-11-27 ASSESSMENT — PAIN - FUNCTIONAL ASSESSMENT: PAIN_FUNCTIONAL_ASSESSMENT: 0-10

## 2019-11-27 ASSESSMENT — PULMONARY FUNCTION TESTS
PIF_VALUE: 1
PIF_VALUE: 1
PIF_VALUE: 0
PIF_VALUE: 0
PIF_VALUE: 1
PIF_VALUE: 0
PIF_VALUE: 1
PIF_VALUE: 1
PIF_VALUE: 0
PIF_VALUE: 1
PIF_VALUE: 0
PIF_VALUE: 1
PIF_VALUE: 1
PIF_VALUE: 0
PIF_VALUE: 0
PIF_VALUE: 1
PIF_VALUE: 0
PIF_VALUE: 1
PIF_VALUE: 1

## 2019-11-27 ASSESSMENT — PAIN SCALES - GENERAL
PAINLEVEL_OUTOF10: 0

## 2019-12-02 ENCOUNTER — HOSPITAL ENCOUNTER (OUTPATIENT)
Dept: OCCUPATIONAL THERAPY | Age: 59
Setting detail: THERAPIES SERIES
Discharge: HOME | End: 2019-12-02
Payer: COMMERCIAL

## 2019-12-02 PROCEDURE — 97530 THERAPEUTIC ACTIVITIES: CPT

## 2019-12-02 PROCEDURE — 97166 OT EVAL MOD COMPLEX 45 MIN: CPT

## 2019-12-02 ASSESSMENT — PAIN DESCRIPTION - FREQUENCY: FREQUENCY: CONTINUOUS

## 2019-12-02 ASSESSMENT — PAIN DESCRIPTION - PAIN TYPE: TYPE: SURGICAL PAIN

## 2019-12-02 ASSESSMENT — PAIN SCALES - GENERAL: PAINLEVEL_OUTOF10: 6

## 2019-12-02 ASSESSMENT — PAIN DESCRIPTION - DESCRIPTORS: DESCRIPTORS: ACHING

## 2019-12-02 ASSESSMENT — PAIN DESCRIPTION - LOCATION: LOCATION: HAND;WRIST

## 2019-12-05 ENCOUNTER — TELEPHONE (OUTPATIENT)
Dept: INTERNAL MEDICINE | Age: 59
End: 2019-12-05

## 2019-12-05 ENCOUNTER — HOSPITAL ENCOUNTER (OUTPATIENT)
Dept: OCCUPATIONAL THERAPY | Age: 59
Setting detail: THERAPIES SERIES
Discharge: HOME | End: 2019-12-05
Payer: COMMERCIAL

## 2019-12-05 PROCEDURE — 97530 THERAPEUTIC ACTIVITIES: CPT

## 2019-12-05 PROCEDURE — 97110 THERAPEUTIC EXERCISES: CPT

## 2019-12-05 PROCEDURE — 97035 APP MDLTY 1+ULTRASOUND EA 15: CPT

## 2019-12-05 ASSESSMENT — PAIN DESCRIPTION - ORIENTATION: ORIENTATION: RIGHT

## 2019-12-05 ASSESSMENT — PAIN DESCRIPTION - PAIN TYPE: TYPE: SURGICAL PAIN

## 2019-12-05 ASSESSMENT — PAIN DESCRIPTION - LOCATION: LOCATION: HAND;WRIST

## 2019-12-05 ASSESSMENT — PAIN DESCRIPTION - DESCRIPTORS: DESCRIPTORS: ACHING;SORE

## 2019-12-10 ENCOUNTER — HOSPITAL ENCOUNTER (OUTPATIENT)
Dept: OCCUPATIONAL THERAPY | Age: 59
Setting detail: THERAPIES SERIES
Discharge: HOME | End: 2019-12-10
Payer: COMMERCIAL

## 2019-12-10 PROCEDURE — 97035 APP MDLTY 1+ULTRASOUND EA 15: CPT

## 2019-12-10 PROCEDURE — 97110 THERAPEUTIC EXERCISES: CPT

## 2019-12-10 ASSESSMENT — PAIN DESCRIPTION - ORIENTATION: ORIENTATION: RIGHT

## 2019-12-10 ASSESSMENT — PAIN DESCRIPTION - DESCRIPTORS: DESCRIPTORS: ACHING;SORE

## 2019-12-10 ASSESSMENT — PAIN DESCRIPTION - PAIN TYPE: TYPE: SURGICAL PAIN

## 2019-12-10 ASSESSMENT — PAIN DESCRIPTION - LOCATION: LOCATION: HAND;WRIST

## 2019-12-10 ASSESSMENT — PAIN SCALES - GENERAL: PAINLEVEL_OUTOF10: 4

## 2019-12-12 ENCOUNTER — HOSPITAL ENCOUNTER (OUTPATIENT)
Dept: OCCUPATIONAL THERAPY | Age: 59
Setting detail: THERAPIES SERIES
Discharge: HOME | End: 2019-12-12
Payer: COMMERCIAL

## 2019-12-12 PROCEDURE — 97530 THERAPEUTIC ACTIVITIES: CPT

## 2019-12-12 PROCEDURE — 97110 THERAPEUTIC EXERCISES: CPT

## 2019-12-12 PROCEDURE — 97035 APP MDLTY 1+ULTRASOUND EA 15: CPT

## 2019-12-16 ENCOUNTER — OFFICE VISIT (OUTPATIENT)
Dept: INTERNAL MEDICINE | Age: 59
End: 2019-12-16
Payer: COMMERCIAL

## 2019-12-16 VITALS
DIASTOLIC BLOOD PRESSURE: 62 MMHG | BODY MASS INDEX: 30.43 KG/M2 | HEART RATE: 64 BPM | HEIGHT: 73 IN | WEIGHT: 229.6 LBS | OXYGEN SATURATION: 97 % | SYSTOLIC BLOOD PRESSURE: 100 MMHG

## 2019-12-16 DIAGNOSIS — M25.562 CHRONIC PAIN OF LEFT KNEE: Primary | ICD-10-CM

## 2019-12-16 DIAGNOSIS — G89.29 CHRONIC PAIN OF LEFT KNEE: Primary | ICD-10-CM

## 2019-12-16 PROCEDURE — 99213 OFFICE O/P EST LOW 20 MIN: CPT | Performed by: FAMILY MEDICINE

## 2019-12-16 ASSESSMENT — ENCOUNTER SYMPTOMS
EYE ITCHING: 0
EYE PAIN: 0
EYE DISCHARGE: 0
BACK PAIN: 0

## 2019-12-18 ENCOUNTER — HOSPITAL ENCOUNTER (OUTPATIENT)
Dept: OCCUPATIONAL THERAPY | Age: 59
Setting detail: THERAPIES SERIES
Discharge: HOME | End: 2019-12-18
Payer: COMMERCIAL

## 2019-12-18 PROCEDURE — 97035 APP MDLTY 1+ULTRASOUND EA 15: CPT

## 2019-12-18 PROCEDURE — 97110 THERAPEUTIC EXERCISES: CPT

## 2019-12-18 PROCEDURE — 97530 THERAPEUTIC ACTIVITIES: CPT

## 2019-12-18 ASSESSMENT — PAIN DESCRIPTION - LOCATION: LOCATION: HAND;WRIST

## 2019-12-18 ASSESSMENT — PAIN DESCRIPTION - ORIENTATION: ORIENTATION: RIGHT

## 2019-12-18 ASSESSMENT — PAIN DESCRIPTION - PAIN TYPE: TYPE: SURGICAL PAIN

## 2019-12-18 ASSESSMENT — PAIN SCALES - GENERAL: PAINLEVEL_OUTOF10: 3

## 2019-12-20 ENCOUNTER — HOSPITAL ENCOUNTER (OUTPATIENT)
Dept: OCCUPATIONAL THERAPY | Age: 59
Setting detail: THERAPIES SERIES
Discharge: HOME | End: 2019-12-20
Payer: COMMERCIAL

## 2019-12-20 PROCEDURE — 97530 THERAPEUTIC ACTIVITIES: CPT

## 2019-12-20 PROCEDURE — 97110 THERAPEUTIC EXERCISES: CPT

## 2019-12-20 PROCEDURE — 97035 APP MDLTY 1+ULTRASOUND EA 15: CPT

## 2019-12-20 ASSESSMENT — PAIN SCALES - GENERAL: PAINLEVEL_OUTOF10: 3

## 2019-12-20 ASSESSMENT — PAIN DESCRIPTION - ORIENTATION: ORIENTATION: RIGHT

## 2019-12-20 ASSESSMENT — PAIN DESCRIPTION - PAIN TYPE: TYPE: SURGICAL PAIN

## 2019-12-23 ENCOUNTER — HOSPITAL ENCOUNTER (OUTPATIENT)
Dept: ORTHOPEDIC SURGERY | Age: 59
Discharge: HOME | End: 2019-12-25
Payer: COMMERCIAL

## 2019-12-23 ENCOUNTER — HOSPITAL ENCOUNTER (OUTPATIENT)
Dept: OCCUPATIONAL THERAPY | Age: 59
Setting detail: THERAPIES SERIES
Discharge: HOME | End: 2019-12-23
Payer: COMMERCIAL

## 2019-12-23 ENCOUNTER — OFFICE VISIT (OUTPATIENT)
Dept: ORTHOPEDIC SURGERY | Age: 59
End: 2019-12-23
Payer: COMMERCIAL

## 2019-12-23 VITALS
BODY MASS INDEX: 30.35 KG/M2 | TEMPERATURE: 97.2 F | HEART RATE: 79 BPM | OXYGEN SATURATION: 98 % | WEIGHT: 229 LBS | HEIGHT: 73 IN

## 2019-12-23 DIAGNOSIS — M25.562 LEFT KNEE PAIN, UNSPECIFIED CHRONICITY: ICD-10-CM

## 2019-12-23 PROBLEM — M17.10 ARTHRITIS OF KNEE: Status: ACTIVE | Noted: 2019-12-23

## 2019-12-23 PROCEDURE — 97530 THERAPEUTIC ACTIVITIES: CPT

## 2019-12-23 PROCEDURE — 73562 X-RAY EXAM OF KNEE 3: CPT

## 2019-12-23 PROCEDURE — 73562 X-RAY EXAM OF KNEE 3: CPT | Performed by: ORTHOPAEDIC SURGERY

## 2019-12-23 PROCEDURE — 99203 OFFICE O/P NEW LOW 30 MIN: CPT | Performed by: ORTHOPAEDIC SURGERY

## 2019-12-23 ASSESSMENT — PAIN DESCRIPTION - PAIN TYPE: TYPE: SURGICAL PAIN

## 2019-12-23 ASSESSMENT — PAIN SCALES - GENERAL: PAINLEVEL_OUTOF10: 2

## 2019-12-23 ASSESSMENT — PAIN DESCRIPTION - ORIENTATION: ORIENTATION: RIGHT

## 2019-12-23 NOTE — PATIENT INSTRUCTIONS
Patient Education        Arthritis: Care Instructions  Your Care Instructions  Arthritis, also called osteoarthritis, is a breakdown of the cartilage that cushions your joints. When the cartilage wears down, your bones rub against each other. This causes pain and stiffness. Many people have some arthritis as they age. Arthritis most often affects the joints of the spine, hands, hips, knees, or feet. You can take simple measures to protect your joints, ease your pain, and help you stay active. Follow-up care is a key part of your treatment and safety. Be sure to make and go to all appointments, and call your doctor if you are having problems. It's also a good idea to know your test results and keep a list of the medicines you take. How can you care for yourself at home? · Stay at a healthy weight. Being overweight puts extra strain on your joints. · Talk to your doctor or physical therapist about exercises that will help ease joint pain. ? Stretch. You may enjoy gentle forms of yoga to help keep your joints and muscles flexible. ? Walk instead of jog. Other types of exercise that are less stressful on the joints include riding a bicycle, swimming, rakesh chi, or water exercise. ? Lift weights. Strong muscles help reduce stress on your joints. Stronger thigh muscles, for example, take some of the stress off of the knees and hips. Learn the right way to lift weights so you do not make joint pain worse. · Take your medicines exactly as prescribed. Call your doctor if you think you are having a problem with your medicine. · Take pain medicines exactly as directed. ? If the doctor gave you a prescription medicine for pain, take it as prescribed. ? If you are not taking a prescription pain medicine, ask your doctor if you can take an over-the-counter medicine. · Use a cane, crutch, walker, or another device if you need help to get around. These can help rest your joints.  You also can use other things to make life easier, such as a higher toilet seat and padded handles on kitchen utensils. · Do not sit in low chairs, which can make it hard to get up. · Put heat or cold on your sore joints as needed. Use whichever helps you most. You also can take turns with hot and cold packs. ? Apply heat 2 or 3 times a day for 20 to 30 minutes--using a heating pad, hot shower, or hot pack--to relieve pain and stiffness. ? Put ice or a cold pack on your sore joint for 10 to 20 minutes at a time. Put a thin cloth between the ice and your skin. When should you call for help? Call your doctor now or seek immediate medical care if:    · You have sudden swelling, warmth, or pain in any joint.     · You have joint pain and a fever or rash.     · You have such bad pain that you cannot use a joint.    Watch closely for changes in your health, and be sure to contact your doctor if:    · You have mild joint symptoms that continue even with more than 6 weeks of care at home.     · You have stomach pain or other problems with your medicine. Where can you learn more? Go to https://[employer]oph[employer]. org and sign in to your [employer] account. Enter U676 in the Oxynade box to learn more about \"Arthritis: Care Instructions. \"     If you do not have an account, please click on the \"Sign Up Now\" link. Current as of: April 1, 2019  Content Version: 12.1  © 7608-6209 [employer]. Care instructions adapted under license by [facility] ([facility]). If you have questions about a medical condition or this instruction, always ask your healthcare professional. [place] [address] any warranty or liability for your use of this information. Patient Education        Knee Arthritis: Care Instructions  Your Care Instructions    Knee arthritis is a breakdown of the cartilage that cushions your knee joint. When the cartilage wears down, your bones rub against each other. This causes pain and stiffness.  Knee arthritis tends to get worse with time. Treatment for knee arthritis involves reducing pain, making the leg muscles stronger, and staying at a healthy body weight. The treatment usually does not improve the health of the cartilage, but it can reduce pain and improve how well your knee works. You can take simple measures to protect your knee joints, ease your pain, and help you stay active. Follow-up care is a key part of your treatment and safety. Be sure to make and go to all appointments, and call your doctor if you are having problems. It's also a good idea to know your test results and keep a list of the medicines you take. How can you care for yourself at home? · Know that knee arthritis will cause more pain on some days than on others. · Stay at a healthy weight. Lose weight if you are overweight. When you stand up, the pressure on your knees from every pound of body weight is multiplied four times. So if you lose 10 pounds, you will reduce the pressure on your knees by 40 pounds. · Talk to your doctor or physical therapist about exercises that will help ease joint pain. ? Stretch to help prevent stiffness and to prevent injury before you exercise. You may enjoy gentle forms of yoga to help keep your knee joints and muscles flexible. ? Walk instead of jog.  ? Ride a bike. This makes your thigh muscles stronger and takes pressure off your knee. ? Wear well-fitting and comfortable shoes. ? Exercise in chest-deep water. This can help you exercise longer with less pain. ? Avoid exercises that include squatting or kneeling. They can put a lot of strain on your knees. ? Talk to your doctor to make sure that the exercise you do is not making the arthritis worse. · Do not sit for long periods of time. Try to walk once in a while to keep your knee from getting stiff. · Ask your doctor or physical therapist whether shoe inserts may reduce your arthritis pain.   · If you can afford it, get new athletic contact your doctor if you have any problems. Where can you learn more? Go to https://chpepiceweb.Rx [employer]. org and sign in to your [employer] account. Enter G255 in the Innohub box to learn more about \"Knee Arthritis: Care Instructions. \"     If you do not have an account, please click on the \"Sign Up Now\" link. Current as of: April 1, 2019  Content Version: 12.1  © 2290-1902 [employer]. Care instructions adapted under license by [facility] ([facility]). If you have questions about a medical condition or this instruction, always ask your healthcare professional. [address] any warranty or liability for your use of this information.

## 2019-12-23 NOTE — PROGRESS NOTES
Subjective:      Patient ID: Robyn Moreira is a 63 y.o. male who presents today for:  Chief Complaint   Patient presents with    Knee Pain     left, twisted years ago, never followed up, xrays done 2017       HPI  Pleasant 67-year-old comes in today with worsening left knee pain he does not take anything for it. 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 He has had typical wear and tear and injuries throughout the years but nothing major and nothing recent  He also notes popping and cracking. History reviewed. No pertinent past medical history. Past Surgical History:   Procedure Laterality Date    CARPAL TUNNEL RELEASE Left 2019    Dr. Angela Scott Left 2019    CARPAL TUNNEL RELEASE performed by Marcus Leonardo MD at [facility] Right 2019    Dr. Angela Scott Right 2019    CARPAL TUNNEL RELEASE-OPEN performed by Marcus Leonardo MD at [address] [place] [address] Left 9years old. Ring finger.     INCISION AND DRAINAGE OF NECK ABSCESS       Social History     Socioeconomic History    Marital status:      Spouse name: Not on file    Number of children: Not on file    Years of education: Not on file    Highest education level: Not on file   Occupational History    Not on file   Social Needs    Financial resource strain: Not on file    Food insecurity:     Worry: Not on file     Inability: Not on file    Transportation needs:     Medical: Not on file     Non-medical: Not on file   Tobacco Use    Smoking status: Former Smoker     Packs/day: 1.00     Years: 20.00     Pack years: 20.00     Types: Cigarettes     Last attempt to quit: 2002     Years since quittin.9    Smokeless tobacco: Never Used   Substance and Sexual Activity    Alcohol use: Yes     Comment: occasionally    Drug use: Never    Sexual activity: Not on file   Lifestyle    Physical activity:     Days per week: Not on file     Minutes per session: Not on file    Stress: Not on file   Relationships    Social connections:     Talks on phone: Not on file     Gets together: Not on file     Attends Hoahaoism service: Not on file     Active member of club or organization: Not on file     Attends meetings of clubs or organizations: Not on file     Relationship status: Not on file    Intimate partner violence:     Fear of current or ex partner: Not on file     Emotionally abused: Not on file     Physically abused: Not on file     Forced sexual activity: Not on file   Other Topics Concern    Not on file   Social History Narrative    Not on file     Family History   Problem Relation Age of Onset    Asthma Mother     Diabetes Mother      No Known Allergies  Current Outpatient Medications on File Prior to Visit   Medication Sig Dispense Refill    triamcinolone (ARISTOCORT) 0.5 % ointment Apply topically 2 times daily. 1 Tube 0    Glucosamine 500 MG CAPS Take 1,000 mg by mouth      Multiple Vitamin (MULTIVITAMINS PO) Take by mouth daily      Omega-3 Fatty Acids (FISH OIL) 1000 MG CAPS Take 3,000 mg by mouth daily       No current facility-administered medications on file prior to visit. Review of Systems   Constitutional: Negative for chills and fever. Cardiovascular: Negative for chest pain. Musculoskeletal: Positive for arthralgias (l knee). Neurological: Negative for dizziness. Objective:   Pulse 79   Temp 97.2 °F (36.2 °C) (Temporal)   Ht 6' 1\" (1.854 m)   Wt 229 lb (103.9 kg)   SpO2 98%   BMI 30.21 kg/m²     Ortho Exam  Varus alignment to the left knee which is relatively fixed. He does not have a flexion contracture. He has patellofemoral crepitus. There is no effusion today. No erythema or warmth around the knee.   He is stable to varus and valgus valgus stress as is as he is to anterior and posterior stress. Radiographs and Laboratory Studies:     Diagnostic Imaging Studies:    X-rays AP weightbearing lateral and merchant's of moderately severe arthritis of the knee with significant medial sided narrowing osteophyte formation subchondral sclerosis is also suggested significant osteophytosis within the patellofemoral joint    Assessment:       Diagnosis Orders   1. Arthritis of knee     2. Left knee pain, unspecified chronicity  XR KNEE LEFT (3 VIEWS)         Plan:   Discussed arthritis the natural history treatment options at this point he is not tried any medication for this I would start with a simple 69 mg ibuprofen as needed with some food. He inquired about a brace as he feels as though sometimes the knee is been a buckle but he does not have any ligamentous instability and I do think that is more near to his knee and I do not think an external brace will help in that or his pain become more severe regard we discussed other options including corticosteroid injections bracing in the future lubricant injections etc. he will follow-up with me as needed in the future should the occasional ibuprofen become more frequent    Orders Placed This Encounter   Procedures    XR KNEE LEFT (3 VIEWS)     Standing Status:   Future     Standing Expiration Date:   12/23/2020     Scheduling Instructions:      Weightbearing AP both knees, left lateral, merchant     Order Specific Question:   Reason for exam:     Answer:   knee pain     No orders of the defined types were placed in this encounter. No follow-ups on file.       Christy David,

## 2020-05-18 ENCOUNTER — TELEPHONE (OUTPATIENT)
Dept: PRIMARY CARE CLINIC | Age: 60
End: 2020-05-18

## 2020-08-21 ENCOUNTER — TELEPHONE (OUTPATIENT)
Dept: INTERNAL MEDICINE | Age: 60
End: 2020-08-21

## 2021-04-30 ENCOUNTER — OFFICE VISIT (OUTPATIENT)
Dept: INTERNAL MEDICINE | Age: 61
End: 2021-04-30
Payer: COMMERCIAL

## 2021-04-30 VITALS
DIASTOLIC BLOOD PRESSURE: 74 MMHG | WEIGHT: 230.2 LBS | HEIGHT: 73 IN | OXYGEN SATURATION: 98 % | SYSTOLIC BLOOD PRESSURE: 120 MMHG | HEART RATE: 71 BPM | RESPIRATION RATE: 14 BRPM | BODY MASS INDEX: 30.51 KG/M2 | TEMPERATURE: 97.4 F

## 2021-04-30 DIAGNOSIS — L40.9 PSORIASIS: Primary | ICD-10-CM

## 2021-04-30 PROCEDURE — G8417 CALC BMI ABV UP PARAM F/U: HCPCS | Performed by: NURSE PRACTITIONER

## 2021-04-30 PROCEDURE — 96372 THER/PROPH/DIAG INJ SC/IM: CPT | Performed by: NURSE PRACTITIONER

## 2021-04-30 PROCEDURE — 3017F COLORECTAL CA SCREEN DOC REV: CPT | Performed by: NURSE PRACTITIONER

## 2021-04-30 PROCEDURE — 1036F TOBACCO NON-USER: CPT | Performed by: NURSE PRACTITIONER

## 2021-04-30 PROCEDURE — G8427 DOCREV CUR MEDS BY ELIG CLIN: HCPCS | Performed by: NURSE PRACTITIONER

## 2021-04-30 PROCEDURE — 99213 OFFICE O/P EST LOW 20 MIN: CPT | Performed by: NURSE PRACTITIONER

## 2021-04-30 RX ORDER — TRIAMCINOLONE ACETONIDE 1 MG/G
CREAM TOPICAL
Qty: 80 G | Refills: 0 | Status: SHIPPED | OUTPATIENT
Start: 2021-04-30 | End: 2021-05-14

## 2021-04-30 RX ORDER — TRIAMCINOLONE ACETONIDE 40 MG/ML
80 INJECTION, SUSPENSION INTRA-ARTICULAR; INTRAMUSCULAR ONCE
Status: COMPLETED | OUTPATIENT
Start: 2021-04-30 | End: 2021-04-30

## 2021-04-30 RX ADMIN — TRIAMCINOLONE ACETONIDE 80 MG: 40 INJECTION, SUSPENSION INTRA-ARTICULAR; INTRAMUSCULAR at 13:30

## 2021-04-30 SDOH — ECONOMIC STABILITY: FOOD INSECURITY: WITHIN THE PAST 12 MONTHS, YOU WORRIED THAT YOUR FOOD WOULD RUN OUT BEFORE YOU GOT MONEY TO BUY MORE.: NEVER TRUE

## 2021-04-30 SDOH — ECONOMIC STABILITY: TRANSPORTATION INSECURITY
IN THE PAST 12 MONTHS, HAS LACK OF TRANSPORTATION KEPT YOU FROM MEETINGS, WORK, OR FROM GETTING THINGS NEEDED FOR DAILY LIVING?: NOT ASKED

## 2021-04-30 ASSESSMENT — PATIENT HEALTH QUESTIONNAIRE - PHQ9
SUM OF ALL RESPONSES TO PHQ9 QUESTIONS 1 & 2: 1
SUM OF ALL RESPONSES TO PHQ QUESTIONS 1-9: 1

## 2021-04-30 ASSESSMENT — ENCOUNTER SYMPTOMS
CHEST TIGHTNESS: 0
SHORTNESS OF BREATH: 0
COUGH: 0
WHEEZING: 0

## 2021-04-30 NOTE — PATIENT INSTRUCTIONS
Patient Education        Psoriasis: Care Instructions  Your Care Instructions  Psoriasis (say \"txz-WA-lt-rica\") is a long-term skin problem that causes thick, white, silvery, or red patches on the skin. The patches may be small or large, and they occur most often on the knees, elbows, scalp, hands, feet, or lower back. The skin may be scaly. If the condition is severe, your skin can become itchy and tender. Psoriasis also can be embarrassing if the patches are on visible areas. You can treat psoriasis with good care at home and with medicine from your doctor. You may put medicine on your skin and take pills or have shots to stop the redness and swelling. Your doctor also may suggest ultraviolet light treatments. Follow-up care is a key part of your treatment and safety. Be sure to make and go to all appointments, and call your doctor if you are having problems. It's also a good idea to know your test results and keep a list of the medicines you take. How can you care for yourself at home? · If your doctor prescribes medicine, use it exactly as prescribed. Follow your doctor's advice for sunlight or ultraviolet light treatment. Call your doctor if you think you are having a problem with your medicine. · Protect your skin:  ? Keep your skin moist. After bathing, put an ointment, cream, or lotion on your skin while it is still damp. This seals in moisture. Use over-the-counter products that your doctor suggests. These may include Cetaphil, Lubriderm, or Eucerin. Petroleum jelly (such as Vaseline) and vegetable shortening (such as Crisco) also work. ? If you have psoriasis on your scalp, use a shampoo with salicylic acid, such as Neutrogena T/Sal.  ? Avoid harsh skin products, such as those that contain alcohol. ? Cover your skin in cold weather. ? Try to prevent sunburn. Although short periods of sun exposure reduce psoriasis in most people, too much sun can damage the skin and cause skin cancer.  In addition, sunburns can trigger psoriasis. Use sunscreen on areas of your skin that do not have psoriasis. Make sure to use a broad-spectrum sunscreen that has a sun protection factor (SPF) of 30 or higher. Use it every day, even when it is cloudy. ? Take care to avoid accidents such as cutting or scraping your skin. An injury to the skin can cause psoriasis patches to form anywhere on the body, including the area of the injury. ? Avoid tight shoes, clothing, watchbands, and hats. These may irritate your skin. ? Use a vaporizer or humidifier to add moisture to your bedroom. Follow the directions for cleaning the machine. · Try making one or more changes to your daily habits to help with managing your psoriasis. For example:  ? Try to control stress and anxiety. They may cause psoriasis to appear suddenly or can make symptoms worse. ? If you smoke, think about quitting. If you need help quitting, talk to your doctor about stop-smoking programs and medicines. ? If you drink, limit or reduce the amount of alcohol you drink. ? If you are overweight, see if you can lose some weight. · Seek support from family and friends. Talk to a counselor or other professional if you feel sad about your condition and need more help. When should you call for help? Call your doctor now or seek immediate medical care if:    · You have signs of infection, such as:  ? Increased pain, swelling, warmth, or redness. ? Red streaks leading from the area. ? Pus draining from the area. ? A fever. Watch closely for changes in your health, and be sure to contact your doctor if:    · You have swelling, stiffness, or pain in your joints.     · You do not get better as expected. Where can you learn more? Go to https://[employer]ightoneb[employer]. org and sign in to your [employer] account. Enter P494 in the Palm box to learn more about \"Psoriasis: Care Instructions. \"     If you do not have an account, please click on the \"Sign Up Now\" link. Current as of: July 2, 2020               Content Version: 12.8  © 2372-5288 [employer]. Care instructions adapted under license by [facility] ([facility]). If you have questions about a medical condition or this instruction, always ask your healthcare professional. [address] any warranty or liability for your use of this information.

## 2021-04-30 NOTE — PROGRESS NOTES
Angle Arroyo (:  1960) is a 62 y.o. male, Established patient, here for evaluation of the following chief complaint(s):  Skin Problem (scalp, behind ears, knees and spots on L arm)      Vitals:    21 1249   BP: 120/74   Pulse: 71   Resp: 14   Temp: 97.4 °F (36.3 °C)   SpO2: 98%       ASSESSMENT/PLAN:  1. Psoriasis  -     triamcinolone acetonide (KENALOG-40) injection 80 mg; 80 mg, Intramuscular, ONCE, Fri 21 at 1345, For 1 dose  -     triamcinolone (KENALOG) 0.1 % cream; Apply thin layer topically 2 times daily for max of 2 weeks. , Disp-80 g, R-0, Normal  -     [facility] - Corrina Allen MD, Dermatology, [facility]        -     Pt request derm referral; has other areas on body he has concerns about. Return if symptoms worsen or fail to improve. SUBJECTIVE/OBJECTIVE:    Rash  This is a recurrent problem. The affected locations include the scalp, left arm, right ear and left ear (bilateral knee). The rash is characterized by dryness, itchiness, redness and scaling. He was exposed to nothing. Pertinent negatives include no anorexia, cough, fatigue, fever, joint pain or shortness of breath. Past treatments include topical steroids (psoriatic shampoo). The treatment provided moderate relief. Review of Systems   Constitutional: Negative for chills, fatigue and fever. Respiratory: Negative for cough, chest tightness, shortness of breath and wheezing. Cardiovascular: Negative for chest pain and palpitations. Gastrointestinal: Negative for anorexia. Musculoskeletal: Negative for arthralgias, joint pain, myalgias and neck pain. Skin: Positive for rash. Negative for pallor and wound. Neurological: Negative for dizziness, light-headedness and headaches. Physical Exam  Vitals signs reviewed. Constitutional:       General: He is not in acute distress. Appearance: Normal appearance. He is well-groomed.    Cardiovascular:      Rate and Rhythm: Normal rate and regular rhythm. Heart sounds: Normal heart sounds, S1 normal and S2 normal.   Pulmonary:      Effort: Pulmonary effort is normal. No respiratory distress. Breath sounds: Normal air entry. No decreased breath sounds, wheezing, rhonchi or rales. Musculoskeletal: Normal range of motion. Skin:     General: Skin is warm and dry. Findings: Erythema, lesion and rash present. Rash is scaling. Comments: Rash on scalp, bilateral arms, knees, ears, and arms is erythematous with thick silvery scaly patches that are pruritic. Neurological:      Mental Status: He is alert and oriented to person, place, and time. Psychiatric:         Mood and Affect: Mood normal.         Behavior: Behavior is cooperative. An electronic signature was used to authenticate this note.     --MODESTO Alamo

## 2023-09-29 ENCOUNTER — OFFICE VISIT (OUTPATIENT)
Dept: FAMILY MEDICINE CLINIC | Age: 63
End: 2023-09-29
Payer: COMMERCIAL

## 2023-09-29 VITALS
HEART RATE: 61 BPM | SYSTOLIC BLOOD PRESSURE: 138 MMHG | HEIGHT: 73 IN | WEIGHT: 242 LBS | DIASTOLIC BLOOD PRESSURE: 76 MMHG | RESPIRATION RATE: 16 BRPM | BODY MASS INDEX: 32.07 KG/M2 | OXYGEN SATURATION: 99 %

## 2023-09-29 DIAGNOSIS — Z23 NEED FOR TETANUS BOOSTER: ICD-10-CM

## 2023-09-29 DIAGNOSIS — L03.011 PARONYCHIA OF RIGHT MIDDLE FINGER: Primary | ICD-10-CM

## 2023-09-29 PROCEDURE — 90471 IMMUNIZATION ADMIN: CPT | Performed by: NURSE PRACTITIONER

## 2023-09-29 PROCEDURE — G8417 CALC BMI ABV UP PARAM F/U: HCPCS | Performed by: NURSE PRACTITIONER

## 2023-09-29 PROCEDURE — 1036F TOBACCO NON-USER: CPT | Performed by: NURSE PRACTITIONER

## 2023-09-29 PROCEDURE — 99213 OFFICE O/P EST LOW 20 MIN: CPT | Performed by: NURSE PRACTITIONER

## 2023-09-29 PROCEDURE — 90715 TDAP VACCINE 7 YRS/> IM: CPT | Performed by: NURSE PRACTITIONER

## 2023-09-29 PROCEDURE — 3017F COLORECTAL CA SCREEN DOC REV: CPT | Performed by: NURSE PRACTITIONER

## 2023-09-29 PROCEDURE — G8427 DOCREV CUR MEDS BY ELIG CLIN: HCPCS | Performed by: NURSE PRACTITIONER

## 2023-09-29 RX ORDER — CLINDAMYCIN PHOSPHATE 10 MG/G
GEL TOPICAL
Qty: 30 G | Refills: 0 | Status: SHIPPED | OUTPATIENT
Start: 2023-09-29 | End: 2023-10-06

## 2023-09-29 RX ORDER — FOLIC ACID 1 MG/1
1 TABLET ORAL DAILY
Qty: 30 TABLET | Refills: 5 | COMMUNITY
Start: 2023-04-20 | End: 2023-10-17

## 2023-09-29 SDOH — ECONOMIC STABILITY: INCOME INSECURITY: HOW HARD IS IT FOR YOU TO PAY FOR THE VERY BASICS LIKE FOOD, HOUSING, MEDICAL CARE, AND HEATING?: NOT HARD AT ALL

## 2023-09-29 SDOH — ECONOMIC STABILITY: FOOD INSECURITY: WITHIN THE PAST 12 MONTHS, YOU WORRIED THAT YOUR FOOD WOULD RUN OUT BEFORE YOU GOT MONEY TO BUY MORE.: NEVER TRUE

## 2023-09-29 SDOH — ECONOMIC STABILITY: HOUSING INSECURITY
IN THE LAST 12 MONTHS, WAS THERE A TIME WHEN YOU DID NOT HAVE A STEADY PLACE TO SLEEP OR SLEPT IN A SHELTER (INCLUDING NOW)?: NO

## 2023-09-29 SDOH — ECONOMIC STABILITY: FOOD INSECURITY: WITHIN THE PAST 12 MONTHS, THE FOOD YOU BOUGHT JUST DIDN'T LAST AND YOU DIDN'T HAVE MONEY TO GET MORE.: NEVER TRUE

## 2023-09-29 ASSESSMENT — PATIENT HEALTH QUESTIONNAIRE - PHQ9
SUM OF ALL RESPONSES TO PHQ QUESTIONS 1-9: 0
SUM OF ALL RESPONSES TO PHQ QUESTIONS 1-9: 0
SUM OF ALL RESPONSES TO PHQ9 QUESTIONS 1 & 2: 0
1. LITTLE INTEREST OR PLEASURE IN DOING THINGS: 0
SUM OF ALL RESPONSES TO PHQ QUESTIONS 1-9: 0
SUM OF ALL RESPONSES TO PHQ QUESTIONS 1-9: 0
2. FEELING DOWN, DEPRESSED OR HOPELESS: 0

## 2023-09-29 ASSESSMENT — ENCOUNTER SYMPTOMS
COLOR CHANGE: 0
SHORTNESS OF BREATH: 0
WHEEZING: 0
FACIAL SWELLING: 0
CHEST TIGHTNESS: 0
SORE THROAT: 0
TROUBLE SWALLOWING: 0

## 2023-09-29 NOTE — PROGRESS NOTES
Hair Gardiner (:  1960) is a 59 y.o. male, Established patient, here for evaluation of the following chief complaint(s):  Finger Pain (Right middle finger. Pain started about 3-4 days ago. Swelling and warm to touch. He thinks he bit his nail off too short. )      Vitals:    23 0949   BP: 138/76   Pulse: 61   Resp: 16   SpO2: 99%       ASSESSMENT/PLAN:  1. Paronychia of right middle finger  -     clindamycin (CLEOCIN-T) 1 % gel; Apply topically 2 times daily. , Disp-30 g, R-0, Normal  -     epsom salt soaks 2x daily  -     advised to not cut nails too short, do not bit on fingernails/hangnails  2. Need for tetanus booster  -     Tdap, BOOSTRIX, (age 8 yrs+), IM      Return if symptoms worsen or fail to improve. SUBJECTIVE/OBJECTIVE:    Other  This is a new problem. Episode onset: right middle finger has infection near the nail, thinks hang nail infection. The problem occurs constantly. The problem has been gradually worsening. Pertinent negatives include no arthralgias, chills, fatigue, fever, myalgias, neck pain or sore throat. The symptoms are aggravated by bending. He has tried nothing for the symptoms. Review of Systems   Constitutional:  Negative for chills, fatigue and fever. HENT:  Negative for facial swelling, sore throat and trouble swallowing. Respiratory:  Negative for chest tightness, shortness of breath and wheezing. Cardiovascular:  Negative for palpitations. Musculoskeletal:  Negative for arthralgias, myalgias and neck pain. Skin:  Positive for wound. Negative for color change and pallor. Physical Exam  Vitals reviewed. Constitutional:       General: He is not in acute distress. Appearance: Normal appearance. HENT:      Mouth/Throat:      Lips: Pink. Mouth: Mucous membranes are moist.      Pharynx: Oropharynx is clear. No pharyngeal swelling or posterior oropharyngeal erythema.    Cardiovascular:      Rate and Rhythm: Normal rate and

## (undated) DEVICE — SUTURE PROL SZ 4-0 L18IN NONABSORBABLE BLU L19MM PS-2 3/8 8682G

## (undated) DEVICE — GLOVE SURG SZ 75 L12IN FNGR THK87MIL WHT LTX FREE

## (undated) DEVICE — PADDING,UNDERCAST,COTTON, 3X4YD STERILE: Brand: MEDLINE

## (undated) DEVICE — Z DISCONTINUED BY MEDLINE USE 2711682 TRAY SKIN PREP DRY W/ PREM GLV

## (undated) DEVICE — HAND AND FT PK

## (undated) DEVICE — DRESSING PETRO W3XL3IN OIL EMUL N ADH GZ KNIT IMPREG CELOS

## (undated) DEVICE — CUFF TOURNIQUET 18 SNG BLADDER DUAL PORT

## (undated) DEVICE — NEEDLE,25GX1.5",REG,BEVEL: Brand: MEDLINE

## (undated) DEVICE — SOLUTION SCRB 4OZ 4% CHG CLN BASE FOR PT SKIN ANTISEPSIS

## (undated) DEVICE — ELECTRODE ELECSURG NDL 2.8 INX7.2 CM COAT INSUL EDGE

## (undated) DEVICE — BANDAGE COMPR W4INXL9FT EXSANG SGL LAYERED NO CLSR ESMARCH

## (undated) DEVICE — SOLUTION IV IRRIG POUR BRL 0.9% SODIUM CHL 2F7124

## (undated) DEVICE — SUTURE PROL SZ 3-0 L30IN NONABSORBABLE BLU L26MM SH 1/2 CIR 8832H

## (undated) DEVICE — HYPODERMIC SAFETY NEEDLE: Brand: MAGELLAN

## (undated) DEVICE — GLOVE SURG SZ 75 L12IN FNGR THK87MIL DK GRN LTX FREE ISOLEX

## (undated) DEVICE — INSTRUMENT ORTH L6IN LNG S STL SGL IN TOME

## (undated) DEVICE — SYRINGE, LUER LOCK, 10ML: Brand: MEDLINE